# Patient Record
Sex: FEMALE | Race: BLACK OR AFRICAN AMERICAN | NOT HISPANIC OR LATINO | Employment: FULL TIME | URBAN - METROPOLITAN AREA
[De-identification: names, ages, dates, MRNs, and addresses within clinical notes are randomized per-mention and may not be internally consistent; named-entity substitution may affect disease eponyms.]

---

## 2023-02-24 LAB
AMPHETAMINES,URINE: 2441 NG/ML
MDA,URINE: <200 NG/ML
MDEA,URINE: <200 NG/ML
MDMA,UR: <200 NG/ML
METHAMPHETAMINE QUANTITATIVE URINE: <200 NG/ML
PHENTERMINE,UR: <200 NG/ML

## 2023-08-03 ENCOUNTER — APPOINTMENT (OUTPATIENT)
Dept: LAB | Facility: LAB | Age: 33
End: 2023-08-03
Payer: COMMERCIAL

## 2023-08-03 LAB
AMPHETAMINE (PRESENCE) IN URINE BY SCREEN METHOD: ABNORMAL
BARBITURATES PRESENCE IN URINE BY SCREEN METHOD: ABNORMAL
BENZODIAZEPINE (PRESENCE) IN URINE BY SCREEN METHOD: ABNORMAL
CANNABINOIDS IN URINE BY SCREEN METHOD: ABNORMAL
COCAINE (PRESENCE) IN URINE BY SCREEN METHOD: ABNORMAL
DRUG SCREEN COMMENT URINE: ABNORMAL
FENTANYL URINE: ABNORMAL
METHADONE (PRESENCE) IN URINE BY SCREEN METHOD: ABNORMAL
OPIATES (PRESENCE) IN URINE BY SCREEN METHOD: ABNORMAL
OXYCODONE (PRESENCE) IN URINE BY SCREEN METHOD: ABNORMAL
PHENCYCLIDINE (PRESENCE) IN URINE BY SCREEN METHOD: ABNORMAL

## 2023-08-07 LAB
AMPHETAMINES,URINE: 1118 NG/ML
MDA,URINE: <200 NG/ML
MDEA,URINE: <200 NG/ML
MDMA,UR: <200 NG/ML
METHAMPHETAMINE QUANTITATIVE URINE: <200 NG/ML
PHENTERMINE,UR: <200 NG/ML

## 2023-10-12 ENCOUNTER — LAB (OUTPATIENT)
Dept: LAB | Facility: LAB | Age: 33
End: 2023-10-12
Payer: COMMERCIAL

## 2023-10-12 ENCOUNTER — OFFICE VISIT (OUTPATIENT)
Dept: OBSTETRICS AND GYNECOLOGY | Facility: CLINIC | Age: 33
End: 2023-10-12
Payer: COMMERCIAL

## 2023-10-12 VITALS
WEIGHT: 165 LBS | BODY MASS INDEX: 26.52 KG/M2 | HEIGHT: 66 IN | DIASTOLIC BLOOD PRESSURE: 72 MMHG | SYSTOLIC BLOOD PRESSURE: 110 MMHG

## 2023-10-12 DIAGNOSIS — N94.6 DYSMENORRHEA: ICD-10-CM

## 2023-10-12 DIAGNOSIS — N92.6 IRREGULAR MENSES: ICD-10-CM

## 2023-10-12 DIAGNOSIS — Z11.3 SCREEN FOR STD (SEXUALLY TRANSMITTED DISEASE): ICD-10-CM

## 2023-10-12 DIAGNOSIS — N92.6 IRREGULAR MENSES: Primary | ICD-10-CM

## 2023-10-12 LAB
ALBUMIN SERPL BCP-MCNC: 4.3 G/DL (ref 3.4–5)
ALP SERPL-CCNC: 66 U/L (ref 33–110)
ALT SERPL W P-5'-P-CCNC: 16 U/L (ref 7–45)
ANION GAP SERPL CALC-SCNC: 10 MMOL/L (ref 10–20)
AST SERPL W P-5'-P-CCNC: 16 U/L (ref 9–39)
BILIRUB SERPL-MCNC: 0.4 MG/DL (ref 0–1.2)
BUN SERPL-MCNC: 15 MG/DL (ref 6–23)
CALCIUM SERPL-MCNC: 9.4 MG/DL (ref 8.6–10.3)
CHLORIDE SERPL-SCNC: 103 MMOL/L (ref 98–107)
CO2 SERPL-SCNC: 27 MMOL/L (ref 21–32)
CREAT SERPL-MCNC: 1.18 MG/DL (ref 0.5–1.05)
GFR SERPL CREATININE-BSD FRML MDRD: 63 ML/MIN/1.73M*2
GLUCOSE SERPL-MCNC: 85 MG/DL (ref 74–99)
POTASSIUM SERPL-SCNC: 4.7 MMOL/L (ref 3.5–5.3)
PREGNANCY TEST URINE, POC: NEGATIVE
PROT SERPL-MCNC: 7.8 G/DL (ref 6.4–8.2)
SODIUM SERPL-SCNC: 135 MMOL/L (ref 136–145)
TSH SERPL-ACNC: 1.58 MIU/L (ref 0.44–3.98)

## 2023-10-12 PROCEDURE — 99203 OFFICE O/P NEW LOW 30 MIN: CPT | Performed by: NURSE PRACTITIONER

## 2023-10-12 PROCEDURE — 1036F TOBACCO NON-USER: CPT | Performed by: NURSE PRACTITIONER

## 2023-10-12 PROCEDURE — 80053 COMPREHEN METABOLIC PANEL: CPT

## 2023-10-12 PROCEDURE — 87800 DETECT AGNT MULT DNA DIREC: CPT

## 2023-10-12 PROCEDURE — 82627 DEHYDROEPIANDROSTERONE: CPT

## 2023-10-12 PROCEDURE — 81025 URINE PREGNANCY TEST: CPT | Performed by: NURSE PRACTITIONER

## 2023-10-12 PROCEDURE — 83498 ASY HYDROXYPROGESTERONE 17-D: CPT

## 2023-10-12 PROCEDURE — 84402 ASSAY OF FREE TESTOSTERONE: CPT

## 2023-10-12 PROCEDURE — 84443 ASSAY THYROID STIM HORMONE: CPT

## 2023-10-12 PROCEDURE — 36415 COLL VENOUS BLD VENIPUNCTURE: CPT

## 2023-10-12 PROCEDURE — 83036 HEMOGLOBIN GLYCOSYLATED A1C: CPT

## 2023-10-12 RX ORDER — NORETHINDRONE ACETATE AND ETHINYL ESTRADIOL 1MG-20(21)
1 KIT ORAL DAILY
Qty: 28 TABLET | Refills: 11 | Status: SHIPPED | OUTPATIENT
Start: 2023-10-12 | End: 2023-11-01 | Stop reason: SDUPTHER

## 2023-10-12 NOTE — PROGRESS NOTES
"Assessment/Plan   Diagnoses and all orders for this visit:  Irregular menses  -     POC pregnancy, urine  -     norethindrone-e.estradioL-iron (Junel FE 1/20) 1 mg-20 mcg (21)/75 mg (7) tablet; Take 1 tablet by mouth once daily.  -     US pelvis; Future  -     17-Hydroxyprogesterone; Future  -     DHEA-sulfate; Future  -     Hemoglobin A1c; Future  -     Testosterone, free, total; Future  -     Comprehensive metabolic panel; Future  -     TSH; Future  Screen for STD (sexually transmitted disease)  -     C. Trachomatis / N. Gonorrhoeae, Amplified Detection    Discussed with patient that stress can affect menses causing cycles to become irregular.  Given chronic h/o irregular menses, PCOS labs ordered  Pelvic US ordered  Discussed use of contraception to regulate cycles, patient agreeable  Rx sent to pharmacy, ACHES s/sx reviewed  Follow up pending results    Sera Becerra, APRN-CNM, APRN-CNP    Subjective   Sera Marie is a 32 y.o. female , who presents for irregular menses. Patient reports irregular periods last year, having a period ~ every 3 months. She states periods became more regular earlier this year (occurring ~ every 4 weeks), but now reports missing periods in August and September. Currently on period now (prior period before this ), reports this period has been longer than typical, heavier, with more cramping. She changes her tampon (super size) every 3 hours. Dysmenorrhea: mild, occurring throughout menses. Cyclic symptoms include: none. Current contraception: none. 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     Menstrual History:  OB History          2    Para   1    Term   1       0    AB   1    Living   1         SAB   1    IAB        Ectopic        Multiple        Live Births                 Patient's last menstrual period was 10/03/2023.     Objective   /72   Ht 1.664 m (5' 5.5\")   Wt 74.8 kg (165 lb)   " Statement Selected LMP 10/03/2023   BMI 27.04 kg/m²     Physical Exam  Constitutional:       General: She is not in acute distress.     Appearance: Normal appearance.   Pulmonary:      Effort: Pulmonary effort is normal.   Abdominal:      Palpations: Abdomen is soft. There is no mass.      Tenderness: There is no abdominal tenderness.      Hernia: No hernia is present.   Neurological:      General: No focal deficit present.      Mental Status: She is alert and oriented to person, place, and time. Mental status is at baseline.   Skin:     General: Skin is warm and dry.   Psychiatric:         Mood and Affect: Mood normal.         Behavior: Behavior normal.         Thought Content: Thought content normal.         Judgment: Judgment normal.

## 2023-10-13 LAB
C TRACH RRNA SPEC QL NAA+PROBE: NEGATIVE
DHEA-S SERPL-MCNC: 240 UG/DL (ref 12–379)
EST. AVERAGE GLUCOSE BLD GHB EST-MCNC: 100 MG/DL
HBA1C MFR BLD: 5.1 %
N GONORRHOEA DNA SPEC QL PROBE+SIG AMP: NEGATIVE

## 2023-10-15 LAB — 17OHP SERPL-MCNC: 81.93 NG/DL

## 2023-10-17 LAB
TESTOSTERONE FREE (CHAN): 8.8 PG/ML (ref 0.1–6.4)
TESTOSTERONE,TOTAL,LC-MS/MS: 79 NG/DL (ref 2–45)

## 2023-10-19 ENCOUNTER — HOSPITAL ENCOUNTER (OUTPATIENT)
Dept: RADIOLOGY | Facility: HOSPITAL | Age: 33
End: 2023-10-19
Payer: COMMERCIAL

## 2023-10-19 PROBLEM — G47.9 SLEEP DIFFICULTIES: Status: ACTIVE | Noted: 2023-10-19

## 2023-10-19 PROBLEM — F32.A DEPRESSION: Status: ACTIVE | Noted: 2023-10-19

## 2023-10-19 PROBLEM — F43.10 PTSD (POST-TRAUMATIC STRESS DISORDER): Status: ACTIVE | Noted: 2023-10-19

## 2023-10-19 PROBLEM — F41.9 ANXIETY: Status: ACTIVE | Noted: 2023-10-19

## 2023-10-19 PROBLEM — F90.2 ADHD (ATTENTION DEFICIT HYPERACTIVITY DISORDER), COMBINED TYPE: Status: ACTIVE | Noted: 2023-10-19

## 2023-10-19 PROBLEM — Z97.5 IUD (INTRAUTERINE DEVICE) IN PLACE: Status: ACTIVE | Noted: 2023-10-19

## 2023-10-19 PROBLEM — N89.8 VAGINAL DISCHARGE: Status: ACTIVE | Noted: 2023-10-19

## 2023-10-19 PROBLEM — F41.0 PANIC ATTACK: Status: ACTIVE | Noted: 2023-10-19

## 2023-10-19 PROBLEM — N94.6 MENSTRUAL CRAMPS: Status: ACTIVE | Noted: 2023-10-19

## 2023-10-19 RX ORDER — BUPROPION HYDROCHLORIDE 150 MG/1
150 TABLET ORAL DAILY
COMMUNITY
End: 2023-10-22 | Stop reason: SDUPTHER

## 2023-10-19 RX ORDER — ACETAMINOPHEN 325 MG/1
TABLET ORAL
COMMUNITY

## 2023-10-19 RX ORDER — IBUPROFEN 800 MG/1
800 TABLET ORAL 2 TIMES DAILY PRN
COMMUNITY
End: 2023-12-14 | Stop reason: ALTCHOICE

## 2023-10-20 ENCOUNTER — TELEMEDICINE (OUTPATIENT)
Dept: BEHAVIORAL HEALTH | Facility: CLINIC | Age: 33
End: 2023-10-20
Payer: COMMERCIAL

## 2023-10-20 ENCOUNTER — TELEPHONE (OUTPATIENT)
Dept: OBSTETRICS AND GYNECOLOGY | Facility: CLINIC | Age: 33
End: 2023-10-20

## 2023-10-20 DIAGNOSIS — R79.89 ELEVATED SERUM CREATININE: Primary | ICD-10-CM

## 2023-10-20 DIAGNOSIS — F41.9 ANXIETY: ICD-10-CM

## 2023-10-20 DIAGNOSIS — F90.2 ADHD (ATTENTION DEFICIT HYPERACTIVITY DISORDER), COMBINED TYPE: ICD-10-CM

## 2023-10-20 DIAGNOSIS — F32.9 CURRENT EPISODE OF MAJOR DEPRESSIVE DISORDER WITHOUT PRIOR EPISODE, UNSPECIFIED DEPRESSION EPISODE SEVERITY: Primary | ICD-10-CM

## 2023-10-20 DIAGNOSIS — G47.9 SLEEP DIFFICULTIES: ICD-10-CM

## 2023-10-20 DIAGNOSIS — F90.2 ATTENTION DEFICIT HYPERACTIVITY DISORDER (ADHD), COMBINED TYPE: ICD-10-CM

## 2023-10-20 PROCEDURE — 99214 OFFICE O/P EST MOD 30 MIN: CPT | Performed by: NURSE PRACTITIONER

## 2023-10-20 ASSESSMENT — ANXIETY QUESTIONNAIRES
GAD7 TOTAL SCORE: 9
4. TROUBLE RELAXING: MORE THAN HALF THE DAYS
7. FEELING AFRAID AS IF SOMETHING AWFUL MIGHT HAPPEN: SEVERAL DAYS
1. FEELING NERVOUS, ANXIOUS, OR ON EDGE: SEVERAL DAYS
3. WORRYING TOO MUCH ABOUT DIFFERENT THINGS: SEVERAL DAYS
6. BECOMING EASILY ANNOYED OR IRRITABLE: MORE THAN HALF THE DAYS
5. BEING SO RESTLESS THAT IT IS HARD TO SIT STILL: SEVERAL DAYS
IF YOU CHECKED OFF ANY PROBLEMS ON THIS QUESTIONNAIRE, HOW DIFFICULT HAVE THESE PROBLEMS MADE IT FOR YOU TO DO YOUR WORK, TAKE CARE OF THINGS AT HOME, OR GET ALONG WITH OTHER PEOPLE: SOMEWHAT DIFFICULT
2. NOT BEING ABLE TO STOP OR CONTROL WORRYING: SEVERAL DAYS

## 2023-10-20 ASSESSMENT — PATIENT HEALTH QUESTIONNAIRE - PHQ9
1. LITTLE INTEREST OR PLEASURE IN DOING THINGS: NOT AT ALL
3. TROUBLE FALLING OR STAYING ASLEEP OR SLEEPING TOO MUCH: SEVERAL DAYS
7. TROUBLE CONCENTRATING ON THINGS, SUCH AS READING THE NEWSPAPER OR WATCHING TELEVISION: MORE THAN HALF THE DAYS
4. FEELING TIRED OR HAVING LITTLE ENERGY: SEVERAL DAYS
5. POOR APPETITE OR OVEREATING: NOT AT ALL
2. FEELING DOWN, DEPRESSED OR HOPELESS: SEVERAL DAYS
8. MOVING OR SPEAKING SO SLOWLY THAT OTHER PEOPLE COULD HAVE NOTICED. OR THE OPPOSITE, BEING SO FIGETY OR RESTLESS THAT YOU HAVE BEEN MOVING AROUND A LOT MORE THAN USUAL: NOT AT ALL
6. FEELING BAD ABOUT YOURSELF - OR THAT YOU ARE A FAILURE OR HAVE LET YOURSELF OR YOUR FAMILY DOWN: SEVERAL DAYS
10. IF YOU CHECKED OFF ANY PROBLEMS, HOW DIFFICULT HAVE THESE PROBLEMS MADE IT FOR YOU TO DO YOUR WORK, TAKE CARE OF THINGS AT HOME, OR GET ALONG WITH OTHER PEOPLE: SOMEWHAT DIFFICULT
9. THOUGHTS THAT YOU WOULD BE BETTER OFF DEAD, OR OF HURTING YOURSELF: NOT AT ALL

## 2023-10-20 NOTE — TELEPHONE ENCOUNTER
----- Message from DON Sutherland-CNM, DON-CNP sent at 10/20/2023 11:42 AM EDT -----  I sent a message to Sera on [employer] discussing new diagnosis of PCOS. I also want her to repeat CMP given elevated creatinine. Order is placed. Please follow up to make sure she sees my message.

## 2023-10-20 NOTE — TELEPHONE ENCOUNTER
Called pt, no answer, left voicemail for return call  Left message making pt aware order is in place for bloodwork to be drawn at the lab

## 2023-10-20 NOTE — PROGRESS NOTES
"Adult Ambulatory Psychiatry Progress Note      Assessment/Plan     Impression:  Sera Marie is a 33 y.o. female domiciled dating, employed with  as senior  with [employer] who presents for follow up with CC of \"I am doing ok.\"    Plan: Continues taking medications and no changes to tx plan.  Medication: Adderall XR 15mg every day, Adderall 15mg every day in the afternoon, Wellbutrin XL 150mg every day, Propranolol 10mg-20mg for anxiety, test anxiety.    Reviewed r/b/a, possible side effects of the medication. Client is aware about the benefit outweighs the risk.     Diagnoses and all orders for this visit:  Current episode of major depressive disorder without prior episode, unspecified depression episode severity  -     buPROPion XL (Wellbutrin XL) 150 mg 24 hr tablet; Take 1 tablet (150 mg) by mouth once daily. Do not crush, chew, or split.  ADHD (attention deficit hyperactivity disorder), combined type  -     amphetamine-dextroamphetamine (Adderall) 15 mg tablet; TAKE 1 TABLET BY MOUTH DAILY IN THE AFTERNOON  Anxiety  -     propranolol (Inderal) 10 mg tablet; TAKE 1 TO 2 TABLETS BY MOUTH FOR PHYSICAL ANXIETY MANAGMENT/TEST ANXIETY  Sleep difficulties  Attention deficit hyperactivity disorder (ADHD), combined type  -     amphetamine-dextroamphetamine XR (Adderall XR) 15 mg 24 hr capsule; Take 1 capsule (15 mg) by mouth once daily. Do not crush or chew.      Therapy: none    Other: n/a    Patient is reminded that if there is SI to call [phone] and get themselves to the closest ED for evaluation, otherwise contact me for other questions/concerns.     Subjective   HPI:  \"Both the patient, and family and caregivers and guardians as appropriate, were informed of the current need to conduct treatment via telephone. I have confirmed the patient's identity via the following (minimum of three) acceptable identifiers as per  Policy PH - , S.S., home address.\"     Present Illness - anxiety   "   Characteristics/Recent psychiatric symptoms (pertinent positives and negatives) - reports stressing out about possibly having PCOS and having to talk with her PCP about if that is the issue, dealing with pain in her ovaries, or even having fibroids. Was put on Junel birth control last week to help control her symptoms. Appetite is up still if not higher. 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 Reports sleep is not the best, as she has a lot of racing, obsessive thoughts leaving her awake - about worrying with passing/not passing the NCLEX. Endorses not having nightmares about this, but 'just overthinking about this' and only gets 5-6 hours. Wakes up tired and at times will take naps. Will workout after work, or cleans her home, and will study for several hours (takes 50 test questions at a time). Reports her relationship with her bf is 'fine. More easy going, especially since nursing school is done.' as they recently got back from a trip to [place], [place] and 'we really had a good time.' Reports struggling at work with workload being so much, and being under-staffed. Reports not having picked up her Adderall d/t $$ for over the past month, d/t only being part time hours, and only went back to full time 2 weeks ago, and can now afford the medication, but her ADHD has been not well-controlled. Endorses some depression 'as I feel sad - like my classmates had already taken the NCLEX, and passed and working, but also just missing school.' Has Propranolol for the physical anxiety when needed. Appetite is 'fine.'     Onset/timeframe - 1 month  Type - anxiety  Duration - daily  Aggravating and/or relieving factors/triggers - worrying about PCOS and also having to study for her NCLEX.  Related symptoms  Treatment and treatment changes (new meds, dosage increases or decreases, med compliance, therapy frequency, etc.)  (Past and Recent) - Adderall 15mg in the afternoon and ER 15mg QD, Propranolol 20mg PRN, Clonidine 0.1mg @HS, Wellbutrin XL 150mg QD.     Issues: Denies SI/HI/AVH.      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     Reports constipation is now only every other day, even without Adderall. Had tried Metamucil and Colace.         Review of Systems   Respiratory: Negative.     Cardiovascular: Negative.    Genitourinary:  Positive for pelvic pain.        PCOS reported   Musculoskeletal: Negative.    Neurological: Negative.        OARRS:  Reviewed OARRS on 10/22/2023 by Calin Person, DON-CNP -OARRS has been reviewed and is consistent with prescribed medications, Considered the risks of abuse, dependence, addiction and diversion, Medication is felt to be clinically appropriate based on documented diagnosis        Is the patient prescribed a combination of a benzodiazepine and opioid?  No    Last Urine Drug Screen / ordered today: No  Recent Results (from the past 8760 hour(s))   Amphetamine Confirm, Urine    Collection Time: 08/03/23  1:55 PM   Result Value Ref Range    Amphetamines,Urine 1,118 ng/mL    MDA, Urine <200 ng/mL    MDEA, Urine <200 ng/mL    MDMA, Urine <200 ng/mL    Methamphetamine Quant, Ur <200 ng/mL    Phentermine,Urine <200 ng/mL   Drug Screen, Urine With Reflex to Confirmation    Collection Time: 08/03/23  1:55 PM   Result Value Ref Range    DRUG SCREEN COMMENT URINE SEE BELOW     Amphetamine Screen, Urine PRESUMPTIVE POSITIVE (A) NEGATIVE    Barbiturate Screen, Urine PRESUMPTIVE NEGATIVE NEGATIVE    BENZODIAZEPINE (PRESENCE) IN URINE BY SCREEN METHOD PRESUMPTIVE NEGATIVE NEGATIVE    Cannabinoid Screen, Urine PRESUMPTIVE NEGATIVE NEGATIVE    Cocaine Screen, Urine PRESUMPTIVE NEGATIVE NEGATIVE    Fentanyl, Ur PRESUMPTIVE NEGATIVE NEGATIVE    Methadone Screen, Urine PRESUMPTIVE NEGATIVE NEGATIVE    Opiate Screen, Urine PRESUMPTIVE NEGATIVE NEGATIVE     Oxycodone Screen, Ur PRESUMPTIVE NEGATIVE NEGATIVE    PCP Screen, Urine PRESUMPTIVE NEGATIVE NEGATIVE     Results are as expected.         Controlled Substance Agreement:  Date of the Last Agreement: May 26, 2021   Reviewed Controlled Substance Agreement including but not limited to the benefits, risks, and alternatives to treatment with a Controlled Substance medication(s).    Stimulants:   What is the patient's goal of therapy? Improved attention/focus  Is this being achieved with current treatment? When taking medication yes.    Activities of Daily Living:   Is your overall impression that this patient is benefiting (symptom reduction outweighs side effects) from stimulant therapy? Yes     1. Physical Functioning: Better  2. Family Relationship: Same  3. Social Relationship: Same  4. Mood: Better  5. Sleep Patterns: Same  6. Overall Function: Better      Objective   Mental Status Exam:  General Appearance: Well groomed, appropriate eye contact  Attitude/Behavior: Superficially cooperative, Fair eye contact (stared off screen frequently as bf was standing in doorway)  Motor: Fidgeting  Speech: Normal rate, volume, prosody  Gait/Station: Other:(comment) (sitting on bed over ZOOM)  Mood: 'anxious'  Affect: Anxious, Euthymic, full-range, Congruent with mood and topic of conversation  Thought Process: Linear, goal directed, Flight of ideas  Thought Associations: No loosening of associations  Thought Content: Normal (fretting about NCLEX, and her PCOS/health issues)  Perception: No perceptual abnormalities noted  Sensorium: Alert and oriented to person, place, time and situation  Insight: Intact  Judgement: Fair  Cognition: Cognitively intact to conversational testing with respect to attention, orientation, fund of knowledge, recent and remote memory, and language  Testing: N/A    BRAD-7/PHQ-9 scores reviewed: 9, 6 compared to 13, 7 reflecting mild improvement in anxiety and depression even though patient's subjective  reported symptoms are higher.      Current Medications:  Current Outpatient Medications on File Prior to Visit   Medication Sig Dispense Refill    norethindrone-e.estradioL-iron (Junel FE 1/20) 1 mg-20 mcg (21)/75 mg (7) tablet Take 1 tablet by mouth once daily. 28 tablet 11    [DISCONTINUED] amphetamine-dextroamphetamine (Adderall) 15 mg tablet TAKE 1 TABLET BY MOUTH DAILY IN THE AFTERNOON 30 tablet 0    [DISCONTINUED] buPROPion XL (Wellbutrin XL) 150 mg 24 hr tablet Take 1 tablet (150 mg) by mouth once daily. Do not crush, chew, or split.      [DISCONTINUED] propranolol (Inderal) 10 mg tablet TAKE 1 TO 2 TABLETS BY MOUTH FOR PHYSICAL ANXIETY MANAGMENT/TEST ANXIETY 180 tablet 1    acetaminophen (Tylenol) 325 mg tablet Take by mouth.      ibuprofen 800 mg tablet Take 1 tablet (800 mg) by mouth 2 times a day as needed for mild pain (1 - 3).       No current facility-administered medications on file prior to visit.       Lab Review:   Lab on 10/12/2023   Component Date Value    17-Hydroxyprogesterone 10/12/2023 81.93     DHEA Sulfate 10/12/2023 240     Hemoglobin A1C 10/12/2023 5.1     Estimated Average Glucose 10/12/2023 100     Testosterone, Free 10/12/2023 8.8 (H)     Testosterone, Total, LC-* 10/12/2023 79 (H)     Glucose 10/12/2023 85     Sodium 10/12/2023 135 (L)     Potassium 10/12/2023 4.7     Chloride 10/12/2023 103     Bicarbonate 10/12/2023 27     Anion Gap 10/12/2023 10     Urea Nitrogen 10/12/2023 15     Creatinine 10/12/2023 1.18 (H)     eGFR 10/12/2023 63     Calcium 10/12/2023 9.4     Albumin 10/12/2023 4.3     Alkaline Phosphatase 10/12/2023 66     Total Protein 10/12/2023 7.8     AST 10/12/2023 16     Bilirubin, Total 10/12/2023 0.4     ALT 10/12/2023 16     Thyroid Stimulating Horm* 10/12/2023 1.58    Office Visit on 10/12/2023   Component Date Value    Preg Test, Ur 10/12/2023 Negative     Neisseria gonorrhea,Ampl* 10/12/2023 Negative     Chlamydia trachomatis, A* 10/12/2023 Negative    Orders Only  on 08/03/2023   Component Date Value    DRUG SCREEN COMMENT URINE 08/03/2023 SEE BELOW     Amphetamine Screen, Urine 08/03/2023 PRESUMPTIVE POSITIVE (A)     Barbiturate Screen, Urine 08/03/2023 PRESUMPTIVE NEGATIVE     BENZODIAZEPINE (PRESENCE* 08/03/2023 PRESUMPTIVE NEGATIVE     Cannabinoid Screen, Urine 08/03/2023 PRESUMPTIVE NEGATIVE     Cocaine Screen, Urine 08/03/2023 PRESUMPTIVE NEGATIVE     Fentanyl, Ur 08/03/2023 PRESUMPTIVE NEGATIVE     Methadone Screen, Urine 08/03/2023 PRESUMPTIVE NEGATIVE     Opiate Screen, Urine 08/03/2023 PRESUMPTIVE NEGATIVE     Oxycodone Screen, Ur 08/03/2023 PRESUMPTIVE NEGATIVE     PCP Screen, Urine 08/03/2023 PRESUMPTIVE NEGATIVE     Amphetamines,Urine 08/03/2023 1,118     MDA, Urine 08/03/2023 <200     MDEA, Urine 08/03/2023 <200     MDMA, Urine 08/03/2023 <200     Methamphetamine Quant, Ur 08/03/2023 <200     Phentermine,Urine 08/03/2023 <200          Time Spent:    Prep time: 1 min  Direct patient time: 28 min  Documentation time: 9 min.  Total time: 38 min.    Next Appointment:  Follow up in about 4 weeks (around 11/17/2023).

## 2023-10-22 ENCOUNTER — PHARMACY VISIT (OUTPATIENT)
Dept: PHARMACY | Facility: CLINIC | Age: 33
End: 2023-10-22
Payer: COMMERCIAL

## 2023-10-22 RX ORDER — BUPROPION HYDROCHLORIDE 150 MG/1
150 TABLET ORAL DAILY
Qty: 90 TABLET | Refills: 3 | Status: SHIPPED | OUTPATIENT
Start: 2023-10-22 | End: 2023-12-14 | Stop reason: ALTCHOICE

## 2023-10-22 RX ORDER — DEXTROAMPHETAMINE SACCHARATE, AMPHETAMINE ASPARTATE, DEXTROAMPHETAMINE SULFATE AND AMPHETAMINE SULFATE 3.75; 3.75; 3.75; 3.75 MG/1; MG/1; MG/1; MG/1
TABLET ORAL
Qty: 30 TABLET | Refills: 0 | Status: SHIPPED | OUTPATIENT
Start: 2023-10-22 | End: 2024-01-11 | Stop reason: SDUPTHER

## 2023-10-22 RX ORDER — DEXTROAMPHETAMINE SACCHARATE, AMPHETAMINE ASPARTATE MONOHYDRATE, DEXTROAMPHETAMINE SULFATE AND AMPHETAMINE SULFATE 3.75; 3.75; 3.75; 3.75 MG/1; MG/1; MG/1; MG/1
15 CAPSULE, EXTENDED RELEASE ORAL DAILY
Qty: 30 CAPSULE | Refills: 0 | Status: SHIPPED | OUTPATIENT
Start: 2023-10-22 | End: 2024-01-11 | Stop reason: SDUPTHER

## 2023-10-22 RX ORDER — PROPRANOLOL HYDROCHLORIDE 10 MG/1
TABLET ORAL
Qty: 180 TABLET | Refills: 1 | Status: SHIPPED | OUTPATIENT
Start: 2023-10-22 | End: 2024-10-20

## 2023-10-22 ASSESSMENT — ENCOUNTER SYMPTOMS
NEUROLOGICAL NEGATIVE: 1
MUSCULOSKELETAL NEGATIVE: 1
CARDIOVASCULAR NEGATIVE: 1
RESPIRATORY NEGATIVE: 1

## 2023-10-23 ENCOUNTER — HOSPITAL ENCOUNTER (OUTPATIENT)
Dept: RADIOLOGY | Facility: HOSPITAL | Age: 33
Discharge: HOME | End: 2023-10-23
Payer: COMMERCIAL

## 2023-10-23 DIAGNOSIS — N92.6 IRREGULAR MENSES: ICD-10-CM

## 2023-10-23 PROCEDURE — 76856 US EXAM PELVIC COMPLETE: CPT | Performed by: RADIOLOGY

## 2023-10-23 PROCEDURE — 76856 US EXAM PELVIC COMPLETE: CPT

## 2023-10-23 PROCEDURE — 76830 TRANSVAGINAL US NON-OB: CPT | Performed by: RADIOLOGY

## 2023-10-25 ENCOUNTER — TELEPHONE (OUTPATIENT)
Dept: OBSTETRICS AND GYNECOLOGY | Facility: CLINIC | Age: 33
End: 2023-10-25
Payer: COMMERCIAL

## 2023-10-25 NOTE — TELEPHONE ENCOUNTER
Pt called to review recent lab results and US. She got the my chart message re: PCOS. Pt has follow up questions about fertility and if she will need to see DANIA. She would like to conceive again soon. Also reviewed US results and pt was reassured there were no urgent findings. Please advise. Thank you.

## 2023-10-26 ENCOUNTER — TELEPHONE (OUTPATIENT)
Dept: OBSTETRICS AND GYNECOLOGY | Facility: CLINIC | Age: 33
End: 2023-10-26
Payer: COMMERCIAL

## 2023-10-30 NOTE — TELEPHONE ENCOUNTER
Pt CB and is aware CARROLL Hutchins tried to reach her. She agrees to recommendation aof a VV and was transferred to the  to schedule.

## 2023-11-01 DIAGNOSIS — N92.6 IRREGULAR MENSES: ICD-10-CM

## 2023-11-01 RX ORDER — NORETHINDRONE ACETATE AND ETHINYL ESTRADIOL AND FERROUS FUMARATE 1MG-20(21)
1 KIT ORAL DAILY
Qty: 84 TABLET | Refills: 4 | Status: SHIPPED | OUTPATIENT
Start: 2023-11-01 | End: 2023-12-14 | Stop reason: ALTCHOICE

## 2023-11-22 ENCOUNTER — APPOINTMENT (OUTPATIENT)
Dept: BEHAVIORAL HEALTH | Facility: CLINIC | Age: 33
End: 2023-11-22
Payer: COMMERCIAL

## 2023-12-06 ENCOUNTER — TELEMEDICINE (OUTPATIENT)
Dept: BEHAVIORAL HEALTH | Facility: CLINIC | Age: 33
End: 2023-12-06
Payer: COMMERCIAL

## 2023-12-06 DIAGNOSIS — G47.9 SLEEP DIFFICULTIES: ICD-10-CM

## 2023-12-06 DIAGNOSIS — F41.9 ANXIETY: ICD-10-CM

## 2023-12-06 DIAGNOSIS — F90.2 ADHD (ATTENTION DEFICIT HYPERACTIVITY DISORDER), COMBINED TYPE: ICD-10-CM

## 2023-12-06 DIAGNOSIS — F33.0 MAJOR DEPRESSIVE DISORDER, RECURRENT EPISODE, MILD (CMS-HCC): ICD-10-CM

## 2023-12-06 PROCEDURE — 99214 OFFICE O/P EST MOD 30 MIN: CPT | Performed by: NURSE PRACTITIONER

## 2023-12-06 ASSESSMENT — PATIENT HEALTH QUESTIONNAIRE - PHQ9
4. FEELING TIRED OR HAVING LITTLE ENERGY: MORE THAN HALF THE DAYS
2. FEELING DOWN, DEPRESSED OR HOPELESS: NOT AT ALL
9. THOUGHTS THAT YOU WOULD BE BETTER OFF DEAD, OR OF HURTING YOURSELF: NOT AT ALL
6. FEELING BAD ABOUT YOURSELF - OR THAT YOU ARE A FAILURE OR HAVE LET YOURSELF OR YOUR FAMILY DOWN: NOT AT ALL
8. MOVING OR SPEAKING SO SLOWLY THAT OTHER PEOPLE COULD HAVE NOTICED. OR THE OPPOSITE, BEING SO FIGETY OR RESTLESS THAT YOU HAVE BEEN MOVING AROUND A LOT MORE THAN USUAL: NOT AT ALL
7. TROUBLE CONCENTRATING ON THINGS, SUCH AS READING THE NEWSPAPER OR WATCHING TELEVISION: NEARLY EVERY DAY
10. IF YOU CHECKED OFF ANY PROBLEMS, HOW DIFFICULT HAVE THESE PROBLEMS MADE IT FOR YOU TO DO YOUR WORK, TAKE CARE OF THINGS AT HOME, OR GET ALONG WITH OTHER PEOPLE: SOMEWHAT DIFFICULT
5. POOR APPETITE OR OVEREATING: SEVERAL DAYS
3. TROUBLE FALLING OR STAYING ASLEEP OR SLEEPING TOO MUCH: MORE THAN HALF THE DAYS
1. LITTLE INTEREST OR PLEASURE IN DOING THINGS: NOT AT ALL

## 2023-12-06 ASSESSMENT — ANXIETY QUESTIONNAIRES
6. BECOMING EASILY ANNOYED OR IRRITABLE: SEVERAL DAYS
4. TROUBLE RELAXING: NOT AT ALL
5. BEING SO RESTLESS THAT IT IS HARD TO SIT STILL: SEVERAL DAYS
3. WORRYING TOO MUCH ABOUT DIFFERENT THINGS: MORE THAN HALF THE DAYS
7. FEELING AFRAID AS IF SOMETHING AWFUL MIGHT HAPPEN: NOT AT ALL
GAD7 TOTAL SCORE: 6
1. FEELING NERVOUS, ANXIOUS, OR ON EDGE: SEVERAL DAYS
2. NOT BEING ABLE TO STOP OR CONTROL WORRYING: SEVERAL DAYS
IF YOU CHECKED OFF ANY PROBLEMS ON THIS QUESTIONNAIRE, HOW DIFFICULT HAVE THESE PROBLEMS MADE IT FOR YOU TO DO YOUR WORK, TAKE CARE OF THINGS AT HOME, OR GET ALONG WITH OTHER PEOPLE: SOMEWHAT DIFFICULT

## 2023-12-06 ASSESSMENT — ENCOUNTER SYMPTOMS
CARDIOVASCULAR NEGATIVE: 1
NEUROLOGICAL NEGATIVE: 1
RESPIRATORY NEGATIVE: 1

## 2023-12-06 NOTE — PROGRESS NOTES
"Adult Ambulatory Psychiatry Progress Note      Assessment/Plan     Impression:  Sera Marie is a 33 y.o. female domiciled dating, employed with  as senior  with [employer] who presents for follow up with CC of \"I am good. I passed the NCLEX. My last day here is Dec. 29th. I am nervous with starting this new job but I am also excited.\"    Plan: Continues taking medications and no changes to treatment plan.    Medication: Adderall XR 15mg every day, Adderall 15mg every day in the afternoon, Wellbutrin XL 150mg every day, Propranolol 10mg-20mg for anxiety, test anxiety.    Reviewed r/b/a, possible side effects of the medication. Client is aware about the benefit outweighs the risk.     Diagnoses and all orders for this visit:  ADHD (attention deficit hyperactivity disorder), combined type  Anxiety  Sleep difficulties  Major depressive disorder, recurrent episode, mild (CMS/HCC)        Therapy: none    Other: n/a    Patient is reminded that if there is SI to call [phone] and get themselves to the closest ED for evaluation, otherwise contact me for other questions/concerns.     Subjective   HPI:  \"Both the patient, and family and caregivers and guardians as appropriate, were informed of the current need to conduct treatment via telephone. 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 Reports sleep is improving as she is now relaxed from not having to study, and preparing for transitioning to her new role. Achieves 6-8 hours. Anxiety is improving if not still above average. Denies depression. Energy levels are improving, but can " feel mentally and physically tired 2-3 days out of the week, after work but also dealing with taking care of her bf and her son's needs, 'that I extend myself'. ADHD is well-managed when she has her Adderall - but has been low on funds so attention has been waning - and admits will be picking up her next script tomorrow. Propranolol helped with physical anxiety when needed - especially when taking her NCLEX. Appetite is 'fine.' Overall denies racing thoughts, but can have obsessive thoughts.      Onset/timeframe - 1 month  Type - anxiety  Duration - daily  Aggravating and/or relieving factors/triggers - worrying about PCOS and also having to study for her NCLEX.  Related symptoms  Treatment and treatment changes (new meds, dosage increases or decreases, med compliance, therapy frequency, etc.) (Past and Recent) - Adderall 15mg in the afternoon and ER 15mg QD, Propranolol 20mg PRN, Clonidine 0.1mg @HS, Wellbutrin XL 150mg QD.     Issues: Denies SI/HI/AVH.      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     Reports constipation is now only every other day, even without Adderall. Had tried Metamucil and Colace.         Review of Systems   Respiratory: Negative.     Cardiovascular: Negative.    Gastrointestinal: Negative.    Genitourinary:  Positive for pelvic pain.        PCOS reported   Neurological: Negative.        OARRS:  Reviewed OARRS on 10/22/2023 by DON Leiva-CNP -OARRS has been reviewed and is consistent with prescribed medications, Considered the risks of abuse, dependence, addiction and diversion, Medication is felt to be clinically appropriate based on documented diagnosis        Is the patient prescribed a combination of a benzodiazepine and opioid?  No    Last Urine Drug Screen / ordered today: No  Recent Results (from the past 8760 hour(s))   Amphetamine Confirm, Urine    Collection Time: 08/03/23  1:55 PM   Result Value Ref Range     Amphetamines,Urine 1,118 ng/mL    MDA, Urine <200 ng/mL    MDEA, Urine <200 ng/mL    MDMA, Urine <200 ng/mL    Methamphetamine Quant, Ur <200 ng/mL    Phentermine,Urine <200 ng/mL   Drug Screen, Urine With Reflex to Confirmation    Collection Time: 08/03/23  1:55 PM   Result Value Ref Range    DRUG SCREEN COMMENT URINE SEE BELOW     Amphetamine Screen, Urine PRESUMPTIVE POSITIVE (A) NEGATIVE    Barbiturate Screen, Urine PRESUMPTIVE NEGATIVE NEGATIVE    BENZODIAZEPINE (PRESENCE) IN URINE BY SCREEN METHOD PRESUMPTIVE NEGATIVE NEGATIVE    Cannabinoid Screen, Urine PRESUMPTIVE NEGATIVE NEGATIVE    Cocaine Screen, Urine PRESUMPTIVE NEGATIVE NEGATIVE    Fentanyl, Ur PRESUMPTIVE NEGATIVE NEGATIVE    Methadone Screen, Urine PRESUMPTIVE NEGATIVE NEGATIVE    Opiate Screen, Urine PRESUMPTIVE NEGATIVE NEGATIVE    Oxycodone Screen, Ur PRESUMPTIVE NEGATIVE NEGATIVE    PCP Screen, Urine PRESUMPTIVE NEGATIVE NEGATIVE     Results are as expected.         Controlled Substance Agreement:  Date of the Last Agreement: May 26, 2021   Reviewed Controlled Substance Agreement including but not limited to the benefits, risks, and alternatives to treatment with a Controlled Substance medication(s).    Stimulants:   What is the patient's goal of therapy? Improved attention/focus  Is this being achieved with current treatment? When taking medication yes.    Activities of Daily Living:   Is your overall impression that this patient is benefiting (symptom reduction outweighs side effects) from stimulant therapy? Yes     1. Physical Functioning: Better  2. Family Relationship: Same  3. Social Relationship: Same  4. Mood: Better  5. Sleep Patterns: Same  6. Overall Function: Better      Objective   Mental Status Exam:  General Appearance: Well groomed, appropriate eye contact  Attitude/Behavior: Cooperative  Motor: No psychomotor agitation or retardation, no tremor or other abnormal movements  Speech: Normal rate, volume, prosody  Gait/Station:  Walks gingerly with a cane (sitting on couch over virtual appointment)  Mood: 'happy, nervous'  Affect: Euthymic, full-range, Anxious, Congruent with mood and topic of conversation  Thought Process: Linear, goal directed  Thought Associations: No loosening of associations  Thought Content: Normal  Perception: No perceptual abnormalities noted  Sensorium: Alert and oriented to person, place, time and situation  Insight: Intact  Judgement: Intact  Cognition: Cognitively intact to conversational testing with respect to attention, orientation, fund of knowledge, recent and remote memory, and language  Testing: N/A    BRAD-7/PHQ-9 scores reviewed: 6, 8 compared to 9, 6 reflecting improvement in anxiety and a mild bump depression even though patient's subjective reported symptoms are better.       Current Medications:  Current Outpatient Medications on File Prior to Visit   Medication Sig Dispense Refill    acetaminophen (Tylenol) 325 mg tablet Take by mouth.      amphetamine-dextroamphetamine (Adderall) 15 mg tablet TAKE 1 TABLET BY MOUTH DAILY IN THE AFTERNOON 30 tablet 0    buPROPion XL (Wellbutrin XL) 150 mg 24 hr tablet Take 1 tablet (150 mg) by mouth once daily. Do not crush, chew, or split. 90 tablet 3    ibuprofen 800 mg tablet Take 1 tablet (800 mg) by mouth 2 times a day as needed for mild pain (1 - 3).      Junel FE 1/20, 28, 1 mg-20 mcg (21)/75 mg (7) tablet TAKE 1 TABLET BY MOUTH EVERY DAY 84 tablet 4    propranolol (Inderal) 10 mg tablet TAKE 1 TO 2 TABLETS BY MOUTH FOR PHYSICAL ANXIETY MANAGMENT/TEST ANXIETY 180 tablet 1    amphetamine-dextroamphetamine XR (Adderall XR) 15 mg 24 hr capsule Take 1 capsule (15 mg) by mouth once daily. Do not crush or chew. 30 capsule 0     No current facility-administered medications on file prior to visit.       Lab Review:   Lab on 10/12/2023   Component Date Value    17-Hydroxyprogesterone 10/12/2023 81.93     DHEA Sulfate 10/12/2023 240     Hemoglobin A1C 10/12/2023 5.1      Estimated Average Glucose 10/12/2023 100     Testosterone, Free 10/12/2023 8.8 (H)     Testosterone, Total, LC-* 10/12/2023 79 (H)     Glucose 10/12/2023 85     Sodium 10/12/2023 135 (L)     Potassium 10/12/2023 4.7     Chloride 10/12/2023 103     Bicarbonate 10/12/2023 27     Anion Gap 10/12/2023 10     Urea Nitrogen 10/12/2023 15     Creatinine 10/12/2023 1.18 (H)     eGFR 10/12/2023 63     Calcium 10/12/2023 9.4     Albumin 10/12/2023 4.3     Alkaline Phosphatase 10/12/2023 66     Total Protein 10/12/2023 7.8     AST 10/12/2023 16     Bilirubin, Total 10/12/2023 0.4     ALT 10/12/2023 16     Thyroid Stimulating Horm* 10/12/2023 1.58    Office Visit on 10/12/2023   Component Date Value    Preg Test, Ur 10/12/2023 Negative     Neisseria gonorrhea,Ampl* 10/12/2023 Negative     Chlamydia trachomatis, A* 10/12/2023 Negative    Orders Only on 08/03/2023   Component Date Value    DRUG SCREEN COMMENT URINE 08/03/2023 SEE BELOW     Amphetamine Screen, Urine 08/03/2023 PRESUMPTIVE POSITIVE (A)     Barbiturate Screen, Urine 08/03/2023 PRESUMPTIVE NEGATIVE     BENZODIAZEPINE (PRESENCE* 08/03/2023 PRESUMPTIVE NEGATIVE     Cannabinoid Screen, Urine 08/03/2023 PRESUMPTIVE NEGATIVE     Cocaine Screen, Urine 08/03/2023 PRESUMPTIVE NEGATIVE     Fentanyl, Ur 08/03/2023 PRESUMPTIVE NEGATIVE     Methadone Screen, Urine 08/03/2023 PRESUMPTIVE NEGATIVE     Opiate Screen, Urine 08/03/2023 PRESUMPTIVE NEGATIVE     Oxycodone Screen, Ur 08/03/2023 PRESUMPTIVE NEGATIVE     PCP Screen, Urine 08/03/2023 PRESUMPTIVE NEGATIVE     Amphetamines,Urine 08/03/2023 1,118     MDA, Urine 08/03/2023 <200     MDEA, Urine 08/03/2023 <200     MDMA, Urine 08/03/2023 <200     Methamphetamine Quant, Ur 08/03/2023 <200     Phentermine,Urine 08/03/2023 <200          Time Spent:    Prep time: 1 min  Direct patient time: 29 min  Documentation time: 8 min.  Total time: 38 min.    Next Appointment:  Follow up in about 6 weeks (around 1/17/2024).

## 2023-12-07 PROBLEM — F33.0 MAJOR DEPRESSIVE DISORDER, RECURRENT EPISODE, MILD (CMS-HCC): Status: ACTIVE | Noted: 2023-12-07

## 2023-12-07 ASSESSMENT — ENCOUNTER SYMPTOMS: GASTROINTESTINAL NEGATIVE: 1

## 2023-12-14 ENCOUNTER — TELEMEDICINE (OUTPATIENT)
Dept: OBSTETRICS AND GYNECOLOGY | Facility: CLINIC | Age: 33
End: 2023-12-14
Payer: COMMERCIAL

## 2023-12-14 VITALS — WEIGHT: 170 LBS | HEIGHT: 66 IN | BODY MASS INDEX: 27.32 KG/M2

## 2023-12-14 DIAGNOSIS — E28.2 PCOS (POLYCYSTIC OVARIAN SYNDROME): Primary | ICD-10-CM

## 2023-12-14 PROCEDURE — 99443 PR PHYS/QHP TELEPHONE EVALUATION 21-30 MIN: CPT | Performed by: NURSE PRACTITIONER

## 2023-12-14 RX ORDER — DROSPIRENONE AND ETHINYL ESTRADIOL 0.02-3(28)
1 KIT ORAL DAILY
Qty: 28 TABLET | Refills: 11 | Status: SHIPPED | OUTPATIENT
Start: 2023-12-14 | End: 2024-12-13

## 2023-12-14 ASSESSMENT — PAIN SCALES - GENERAL: PAINLEVEL: 0-NO PAIN

## 2023-12-14 NOTE — PROGRESS NOTES
"Assessment/Plan   Diagnoses and all orders for this visit:  PCOS (polycystic ovarian syndrome)  -     drospirenone-ethinyl estradioL (Keara Lloyd) 3-0.02 mg tablet; Take 1 tablet by mouth once daily.    Reviewed labs and imaging with patient, discussed they are consistent with PCOS diagnosis. Discussed management of PCOS typically with contraception. Patient desires pregnancy but not for at least the next year. Patient agreeable to trial of COCs to regulate periods. BUSHRA Rx sent. ACHES s/sx reviewed.     Follow up in about 3 months (around 3/14/2024).     Sera Becerra, APRN-CNM, APRN-CNP    Subjective   Seraaparna Marie is a 33 y.o. female presents for telehealth visit to review labs and imaging.     HPI    Patient with h/o irregular periods. LMP 11/-9, has not yet gotten a period this month yet. Patient reports hirsutism, but states she has always had this even as a child, has been undergoing laser removal for facial hair.     Menstrual History:  OB History          2    Para   1    Term   1       0    AB   1    Living   1         SAB   1    IAB        Ectopic        Multiple        Live Births                    Patient's last menstrual period was 2023.       Objective   Ht 1.676 m (5' 6\")   Wt 77.1 kg (170 lb)   LMP 2023   BMI 27.44 kg/m²     Recent Results (from the past 1680 hour(s))   POC pregnancy, urine    Collection Time: 10/12/23  3:02 PM   Result Value Ref Range    Preg Test, Ur Negative Negative   17-Hydroxyprogesterone    Collection Time: 10/12/23  3:31 PM   Result Value Ref Range    17-Hydroxyprogesterone 81.93 <=206.00 ng/dL   DHEA-sulfate    Collection Time: 10/12/23  3:31 PM   Result Value Ref Range    DHEA Sulfate 240 12 - 379 ug/dL   Hemoglobin A1c    Collection Time: 10/12/23  3:31 PM   Result Value Ref Range    Hemoglobin A1C 5.1 see below %    Estimated Average Glucose 100 Not Established mg/dL   Testosterone, free, total    Collection Time: 10/12/23  3:31 PM "   Result Value Ref Range    Testosterone, Free 8.8 (H) 0.1 - 6.4 pg/mL    Testosterone, Total, LC-MS/MS 79 (H) 2 - 45 ng/dL   Comprehensive metabolic panel    Collection Time: 10/12/23  3:31 PM   Result Value Ref Range    Glucose 85 74 - 99 mg/dL    Sodium 135 (L) 136 - 145 mmol/L    Potassium 4.7 3.5 - 5.3 mmol/L    Chloride 103 98 - 107 mmol/L    Bicarbonate 27 21 - 32 mmol/L    Anion Gap 10 10 - 20 mmol/L    Urea Nitrogen 15 6 - 23 mg/dL    Creatinine 1.18 (H) 0.50 - 1.05 mg/dL    eGFR 63 >60 mL/min/1.73m*2    Calcium 9.4 8.6 - 10.3 mg/dL    Albumin 4.3 3.4 - 5.0 g/dL    Alkaline Phosphatase 66 33 - 110 U/L    Total Protein 7.8 6.4 - 8.2 g/dL    AST 16 9 - 39 U/L    Bilirubin, Total 0.4 0.0 - 1.2 mg/dL    ALT 16 7 - 45 U/L   TSH    Collection Time: 10/12/23  3:31 PM   Result Value Ref Range    Thyroid Stimulating Hormone 1.58 0.44 - 3.98 mIU/L   C. Trachomatis / N. Gonorrhoeae, Amplified Detection    Collection Time: 10/12/23  4:24 PM   Result Value Ref Range    Neisseria gonorrhea,Amplified Negative Negative    Chlamydia trachomatis, Amplified Negative Negative

## 2023-12-14 NOTE — PATIENT INSTRUCTIONS
"What is polycystic ovary syndrome?  Polycystic ovary syndrome (\"PCOS\") is a condition that can cause irregular periods, acne, extra facial hair, or hair loss from the head. It is very common. About 5 to 8 percent of all women have PCOS. Most, but not all, people with PCOS have excess weight or obesity.    What causes PCOS?  In PCOS, the ovaries do not work normally and they produce too much testosterone. Testosterone is called a \"male hormone,\" but all people have some testosterone. Normally, the ovaries produce very small amounts. But in PCOS, they make more.    About once a month, the ovaries are supposed to make a structure called a \"follicle\". As the follicle grows, it makes hormones. Then, it releases an egg. This is called \"ovulation.\" But in people with PCOS, the ovary makes many small follicles instead of 1 big one. Hormone levels can get out of balance. And ovulation doesn't happen every month the way that it is supposed to. Doctors aren't sure why this happens in some cases.      What are the symptoms of PCOS?  Symptoms can include:     ? Having fewer than 8 periods a year     ? Growing thick, dark hair on the upper lip, chin, sideburn area, chest, or belly     ? Acne (oily skin and pimples on their face)     ? Hair loss from the head     ? Trouble getting pregnant without medical help     ? Weight gain and obesity    Should I see a doctor or nurse, even if my symptoms are mild?  Yes. PCOS increases your risk of other health problems, including:     ? Diabetes (high blood sugar)     ? High cholesterol levels     ? Sleep apnea, which is a sleep disorder that causes people to briefly stop breathing while they sleep     ? Depression or anxiety     ? Eating disorders, such as binge eating or bulimia     ? Less interest in sex    How is PCOS treated?  The most common treatment is to take birth control pills. But there are other treatments than can help with symptoms, too.     ? Birth control pills - This is the " main treatment for PCOS. The pills don't cure the condition. But they can improve many   of its symptoms, like irregular periods, acne, and facial hair. Birth control pills also lower your risk of cancer of the uterus.     ? Anti-androgens - These medicines block hormones that cause some PCOS symptoms like acne and facial hair growth. Spironolactone (brand name: Aldactone) is the most commonly used one.     ? Progestin - This hormone can make your periods regular, but only if you take it regularly. It also lowers the risk of cancer of the uterus. Most doctors use medroxyprogesterone (brand name: Provera) or natural progesterone (brand name: Prometrium).     ? Metformin (brand name: Glucophage) - This medicine can help make your periods more regular. But it works only in about half of the people who try it. In people with diabetes, this medicine helps to lower blood sugar levels.     ? Medicated skin lotion or antibiotics to treat acne     ? Laser therapy or electrolysis to remove extra hair    Is there anything I can do on my own?  Yes. If you have excess weight or obesity, losing weight can improve many of your symptoms. Losing just 5 percent of your body weight can help a lot. As an example, for a person who weighs 200 pounds, this would mean losing 10 pounds.    What if I want to get pregnant?  Most people with PCOS are able to get pregnant, but it is usually easier for those who are not overweight. If you are overweight, losing weight can help make your periods more regular and improve your chances of getting pregnant. If you lose weight but your periods are still irregular, your doctor can give you medicines to help you ovulate and improve your chances of getting pregnant.

## 2024-01-11 DIAGNOSIS — F90.2 ADHD (ATTENTION DEFICIT HYPERACTIVITY DISORDER), COMBINED TYPE: ICD-10-CM

## 2024-01-11 DIAGNOSIS — F90.2 ATTENTION DEFICIT HYPERACTIVITY DISORDER (ADHD), COMBINED TYPE: ICD-10-CM

## 2024-01-11 RX ORDER — DEXTROAMPHETAMINE SACCHARATE, AMPHETAMINE ASPARTATE MONOHYDRATE, DEXTROAMPHETAMINE SULFATE AND AMPHETAMINE SULFATE 3.75; 3.75; 3.75; 3.75 MG/1; MG/1; MG/1; MG/1
15 CAPSULE, EXTENDED RELEASE ORAL DAILY
Qty: 30 CAPSULE | Refills: 0 | Status: SHIPPED | OUTPATIENT
Start: 2024-01-11 | End: 2024-03-02

## 2024-01-11 RX ORDER — DEXTROAMPHETAMINE SACCHARATE, AMPHETAMINE ASPARTATE, DEXTROAMPHETAMINE SULFATE AND AMPHETAMINE SULFATE 3.75; 3.75; 3.75; 3.75 MG/1; MG/1; MG/1; MG/1
TABLET ORAL
Qty: 30 TABLET | Refills: 0 | Status: SHIPPED | OUTPATIENT
Start: 2024-01-11 | End: 2024-07-11

## 2024-01-11 NOTE — PROGRESS NOTES
Refilling both Adderalls. Will have patient sign CSA at a future in person appt.    Reviewed OARRS on 01/11/2024 by AARON Leiva -OARRS has been reviewed and is consistent with prescribed medications, Considered the risks of abuse, dependence, addiction and diversion, Medication is felt to be clinically appropriate based on documented diagnosis

## 2024-01-24 ENCOUNTER — APPOINTMENT (OUTPATIENT)
Dept: BEHAVIORAL HEALTH | Facility: CLINIC | Age: 34
End: 2024-01-24
Payer: COMMERCIAL

## 2024-01-31 PROCEDURE — RXMED WILLOW AMBULATORY MEDICATION CHARGE

## 2024-02-01 ENCOUNTER — PHARMACY VISIT (OUTPATIENT)
Dept: PHARMACY | Facility: CLINIC | Age: 34
End: 2024-02-01
Payer: COMMERCIAL

## 2024-02-22 ENCOUNTER — APPOINTMENT (OUTPATIENT)
Dept: OBSTETRICS AND GYNECOLOGY | Facility: CLINIC | Age: 34
End: 2024-02-22
Payer: COMMERCIAL

## 2024-03-25 NOTE — PROGRESS NOTES
"Assessment/Plan   {Assess/PlanSmartLinks:59237}    Sera BHARDWAJ Hernan, APRN-CNM, APRN-CNP     Subjective   Sera Marie is a 33 y.o. female who is here for a routine exam.     Concerns today:  ***    No LMP recorded.   Periods are {gyn period regularity:715}, lasting {numbers; 0-10:12166} days.   Dysmenorrhea:{gyn dysmenorrhea:716}.   Cyclic symptoms include {sys gyn cyclic sx:47946}.     Sexual Activity: {Sexual activity (female):91109}  Pain with intercourse? {Yes/No:49243}  Loss of desire? {Yes/No:49284}  Able to have an orgasm? {Yes/No:48929}    History of prior STI: {STI:99084}    Current contraception: {contraceptive method:5051}    Last pap: 2018 NILM  History of abnormal Pap smear: yes - ASCUS 2016  Family history of uterine or ovarian cancer: {yes***/no:61549::\"no\"}    Last mammogram: ***  History of abnormal mammogram: {yes***/no:78675::\"no\"}  Family history of breast cancer: {yes***/no:16082::\"no\"}  Menstrual History:  OB History          2    Para   1    Term   1       0    AB   1    Living   1         SAB   1    IAB        Ectopic        Multiple        Live Births                  No LMP recorded.       Objective   There were no vitals taken for this visit.  OBGyn Exam   "

## 2024-03-28 ENCOUNTER — APPOINTMENT (OUTPATIENT)
Dept: OBSTETRICS AND GYNECOLOGY | Facility: CLINIC | Age: 34
End: 2024-03-28
Payer: COMMERCIAL

## 2025-01-03 ENCOUNTER — OFFICE VISIT (OUTPATIENT)
Dept: URGENT CARE | Age: 35
End: 2025-01-03
Payer: COMMERCIAL

## 2025-01-03 ENCOUNTER — PHARMACY VISIT (OUTPATIENT)
Dept: PHARMACY | Facility: CLINIC | Age: 35
End: 2025-01-03
Payer: COMMERCIAL

## 2025-01-03 VITALS
DIASTOLIC BLOOD PRESSURE: 86 MMHG | SYSTOLIC BLOOD PRESSURE: 138 MMHG | TEMPERATURE: 98.6 F | RESPIRATION RATE: 17 BRPM | OXYGEN SATURATION: 96 % | HEART RATE: 78 BPM

## 2025-01-03 DIAGNOSIS — S00.431A CONTUSION OF RIGHT EAR, INITIAL ENCOUNTER: ICD-10-CM

## 2025-01-03 DIAGNOSIS — H72.91 PERFORATION OF TYMPANIC MEMBRANE, RIGHT: Primary | ICD-10-CM

## 2025-01-03 PROCEDURE — RXMED WILLOW AMBULATORY MEDICATION CHARGE

## 2025-01-03 RX ORDER — CIPROFLOXACIN AND DEXAMETHASONE 3; 1 MG/ML; MG/ML
4 SUSPENSION/ DROPS AURICULAR (OTIC) 2 TIMES DAILY
Qty: 7.5 ML | Refills: 0 | Status: SHIPPED | OUTPATIENT
Start: 2025-01-03 | End: 2025-01-22

## 2025-01-03 RX ORDER — AMOXICILLIN 875 MG/1
875 TABLET, FILM COATED ORAL 2 TIMES DAILY
Qty: 14 TABLET | Refills: 0 | Status: SHIPPED | OUTPATIENT
Start: 2025-01-03 | End: 2025-01-10

## 2025-01-03 ASSESSMENT — PATIENT HEALTH QUESTIONNAIRE - PHQ9
SUM OF ALL RESPONSES TO PHQ9 QUESTIONS 1 AND 2: 0
1. LITTLE INTEREST OR PLEASURE IN DOING THINGS: NOT AT ALL
2. FEELING DOWN, DEPRESSED OR HOPELESS: NOT AT ALL

## 2025-01-03 ASSESSMENT — ENCOUNTER SYMPTOMS
TREMORS: 0
CHILLS: 0
DIZZINESS: 0
FEVER: 0
WEAKNESS: 0
SINUS PRESSURE: 0
HEADACHES: 0
CARDIOVASCULAR NEGATIVE: 1
RESPIRATORY NEGATIVE: 1

## 2025-01-03 ASSESSMENT — PAIN SCALES - GENERAL: PAINLEVEL_OUTOF10: 3

## 2025-01-03 NOTE — PROGRESS NOTES
Subjective   Patient ID: Sera Marie is a 34 y.o. female. They present today with a chief complaint of Earache (Leaking fluid and muffle x3 day ear injury on Tuesday right ear).    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  Yesterday 5 AM noticed sudden increased pain followed by blood-tinged discharge, muffled hearing and continued earache so she is here for evaluation.  Denies headache, no dizziness.  No vision changes.  No nausea or vomiting.  No recent cold symptoms no similar symptoms in the past. Muffled hearing, feels underwater.       Earache   Associated symptoms include ear discharge and hearing loss. Pertinent negatives include no headaches.       Past Medical History  Allergies as of 01/03/2025    (No Known Allergies)       (Not in a hospital admission)       Past Medical History:   Diagnosis Date    Abnormal uterine and vaginal bleeding, unspecified 02/06/2017    Abnormal uterine bleeding (AUB)    Acute candidiasis of vulva and vagina 02/10/2017    Yeast infection of the vagina    Acute vaginitis 09/15/2020    Bacterial vaginosis    Acute vaginitis 02/27/2019    Acute vaginitis    Acute vaginitis 11/10/2018    Acute vaginitis    Acute vaginitis     Bacterial vaginosis    Acute vaginitis 06/01/2019    Bacterial vaginosis    Acute vaginitis     Acute vaginitis    Acute vaginitis 06/19/2019    BV (bacterial vaginosis)    Chlamydial infection, unspecified 09/12/2016    Chlamydia    Chlamydial infection, unspecified 04/03/2017    Chlamydia infection    Encounter for gynecological examination (general) (routine) without abnormal findings 07/29/2020    Well woman exam with routine gynecological exam    Encounter for other screening for malignant neoplasm of breast 12/19/2018    Screening for breast cancer    Encounter for screening for malignant neoplasm of cervix  12/19/2018    Screening for cervical cancer    Hirsutism 08/01/2020    Hirsutism    Inadequate sleep hygiene 09/02/2020    History of difficulty sleeping    Other conditions influencing health status 07/19/2019    History of cough    Other conditions influencing health status     History of pregnancy    Other specified health status 11/17/2014    Birth control    Personal history of other diseases of the female genital tract     History of vaginal discharge    Personal history of other diseases of the female genital tract     Personal history of dysmenorrhea    Personal history of other diseases of the female genital tract     History of vaginal discharge    Personal history of other diseases of the female genital tract 02/06/2017    History of abnormal uterine bleeding    Personal history of other diseases of the female genital tract     History of vaginal pruritus    Personal history of other infectious and parasitic diseases     History of gonorrhea    Personal history of other specified conditions 07/19/2019    History of fever    Personal history of other specified conditions 07/19/2019    History of wheezing    Type A blood, Rh positive     Blood type A+    Unspecified abnormal findings in urine     Abnormal urine findings    Urinary tract infection, site not specified 12/21/2013    Urinary tract infection       Past Surgical History:   Procedure Laterality Date    OTHER SURGICAL HISTORY  06/23/2020    [place] tooth extraction    OTHER SURGICAL HISTORY  06/23/2020    Oral surgery        reports that she has never smoked. She has never used smokeless tobacco. She reports that she does not drink alcohol and does not use drugs.    Review of Systems  Review of Systems   Constitutional:  Negative for chills and fever.   HENT:  Positive for ear discharge, ear pain and hearing loss. Negative for congestion, nosebleeds, sinus pressure and tinnitus.    Respiratory: Negative.     Cardiovascular: Negative.     Neurological:  Negative for dizziness, tremors, weakness and headaches.                                  Objective    Vitals:    01/03/25 0840   BP: 138/86   BP Location: Left arm   Patient Position: Sitting   BP Cuff Size: Adult   Pulse: 78   Resp: 17   Temp: 37 °C (98.6 °F)   TempSrc: Oral   SpO2: 96%     Patient's last menstrual period was 12/05/2024.    Physical Exam  Vitals and nursing note reviewed.   Constitutional:       Appearance: She is not ill-appearing or diaphoretic.   HENT:      Head:      Comments: Right TM with perforation, erythematous, normal external canal, nontender mastoid, no flores sign.      Left Ear: There is no impacted cerumen.      Nose: No congestion.   Cardiovascular:      Rate and Rhythm: Normal rate and regular rhythm.   Pulmonary:      Effort: Pulmonary effort is normal.      Breath sounds: Normal breath sounds.   Musculoskeletal:      Cervical back: No rigidity or tenderness.   Lymphadenopathy:      Cervical: No cervical adenopathy.   Neurological:      General: No focal deficit present.      Mental Status: She is alert and oriented to person, place, and time.      Motor: No weakness.      Coordination: Coordination normal.      Gait: Gait normal.   Psychiatric:         Mood and Affect: Mood normal.         Behavior: Behavior normal.         Procedures    Point of Care Test & Imaging Results from this visit  No results found for this visit on 01/03/25.   No results found.    Diagnostic study results (if any) were reviewed by [facility].    Assessment/Plan   Allergies, medications, history, and pertinent labs/EKGs/Imaging reviewed by Joselyn Diez.     Medical Decision Making  34-year-old with right ear perforation, treatment with oral and topical antibiotics.  Please follow-up with ENT in 7 to 10 days.  Please use earplugs during showering.  You may use Tylenol or ibuprofen as needed for symptom relief.  If new or worsening symptoms please return for evaluation or go  to the ER .    Orders and Diagnoses  Diagnoses and all orders for this visit:  Perforation of tympanic membrane, right  -     ciprofloxacin-dexamethasone (Ciprodex) otic suspension; Administer 4 drops into the right ear 2 times a day for 7 days.  -     amoxicillin (Amoxil) 875 mg tablet; Take 1 tablet (875 mg) by mouth 2 times a day for 7 days.  -     Referral to ENT; Future  Contusion of right ear, initial encounter      Medical Admin Record      Patient disposition: Home    Electronically signed by [facility]  10:32 AM

## 2025-01-03 NOTE — PATIENT INSTRUCTIONS
34-year-old with right ear perforation, treatment with oral and topical antibiotics.  Please follow-up with ENT in 7 to 10 days.  Please use earplugs during showering.  You may use Tylenol or ibuprofen as needed for symptom relief.  If new or worsening symptoms please return for evaluation or go to the ER .

## 2025-01-08 ENCOUNTER — OFFICE VISIT (OUTPATIENT)
Dept: OTOLARYNGOLOGY | Facility: CLINIC | Age: 35
End: 2025-01-08
Payer: COMMERCIAL

## 2025-01-08 VITALS
HEART RATE: 80 BPM | HEIGHT: 66 IN | SYSTOLIC BLOOD PRESSURE: 121 MMHG | WEIGHT: 190 LBS | DIASTOLIC BLOOD PRESSURE: 82 MMHG | BODY MASS INDEX: 30.53 KG/M2 | TEMPERATURE: 98.6 F

## 2025-01-08 DIAGNOSIS — H92.11 OTORRHEA OF RIGHT EAR: ICD-10-CM

## 2025-01-08 DIAGNOSIS — H91.91 HEARING LOSS OF RIGHT EAR, UNSPECIFIED HEARING LOSS TYPE: Primary | ICD-10-CM

## 2025-01-08 DIAGNOSIS — H93.8X1 SENSATION OF PLUGGED EAR ON RIGHT SIDE: ICD-10-CM

## 2025-01-08 DIAGNOSIS — H72.91 PERFORATION OF TYMPANIC MEMBRANE, RIGHT: ICD-10-CM

## 2025-01-08 PROCEDURE — 99213 OFFICE O/P EST LOW 20 MIN: CPT | Performed by: NURSE PRACTITIONER

## 2025-01-08 PROCEDURE — 1036F TOBACCO NON-USER: CPT | Performed by: NURSE PRACTITIONER

## 2025-01-08 PROCEDURE — 3008F BODY MASS INDEX DOCD: CPT | Performed by: NURSE PRACTITIONER

## 2025-01-08 PROCEDURE — 99203 OFFICE O/P NEW LOW 30 MIN: CPT | Performed by: NURSE PRACTITIONER

## 2025-01-08 SDOH — ECONOMIC STABILITY: FOOD INSECURITY: WITHIN THE PAST 12 MONTHS, YOU WORRIED THAT YOUR FOOD WOULD RUN OUT BEFORE YOU GOT MONEY TO BUY MORE.: NEVER TRUE

## 2025-01-08 SDOH — ECONOMIC STABILITY: FOOD INSECURITY: WITHIN THE PAST 12 MONTHS, THE FOOD YOU BOUGHT JUST DIDN'T LAST AND YOU DIDN'T HAVE MONEY TO GET MORE.: NEVER TRUE

## 2025-01-08 ASSESSMENT — COLUMBIA-SUICIDE SEVERITY RATING SCALE - C-SSRS
6. HAVE YOU EVER DONE ANYTHING, STARTED TO DO ANYTHING, OR PREPARED TO DO ANYTHING TO END YOUR LIFE?: NO
2. HAVE YOU ACTUALLY HAD ANY THOUGHTS OF KILLING YOURSELF?: NO
1. IN THE PAST MONTH, HAVE YOU WISHED YOU WERE DEAD OR WISHED YOU COULD GO TO SLEEP AND NOT WAKE UP?: NO

## 2025-01-08 ASSESSMENT — PATIENT HEALTH QUESTIONNAIRE - PHQ9
1. LITTLE INTEREST OR PLEASURE IN DOING THINGS: NOT AT ALL
2. FEELING DOWN, DEPRESSED OR HOPELESS: NOT AT ALL
SUM OF ALL RESPONSES TO PHQ9 QUESTIONS 1 AND 2: 0

## 2025-01-08 ASSESSMENT — LIFESTYLE VARIABLES
AUDIT-C TOTAL SCORE: 1
SKIP TO QUESTIONS 9-10: 1
HOW OFTEN DO YOU HAVE A DRINK CONTAINING ALCOHOL: MONTHLY OR LESS
HOW MANY STANDARD DRINKS CONTAINING ALCOHOL DO YOU HAVE ON A TYPICAL DAY: 1 OR 2
HOW OFTEN DO YOU HAVE SIX OR MORE DRINKS ON ONE OCCASION: NEVER

## 2025-01-08 ASSESSMENT — PAIN SCALES - GENERAL: PAINLEVEL_OUTOF10: 0-NO PAIN

## 2025-01-08 ASSESSMENT — ENCOUNTER SYMPTOMS
LOSS OF SENSATION IN FEET: 0
DEPRESSION: 0
OCCASIONAL FEELINGS OF UNSTEADINESS: 0
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
## 2025-01-13 ENCOUNTER — APPOINTMENT (OUTPATIENT)
Dept: AUDIOLOGY | Facility: CLINIC | Age: 35
End: 2025-01-13
Payer: COMMERCIAL

## 2025-02-26 ENCOUNTER — TELEPHONE (OUTPATIENT)
Dept: OBSTETRICS AND GYNECOLOGY | Facility: CLINIC | Age: 35
End: 2025-02-26
Payer: COMMERCIAL

## 2025-02-26 NOTE — TELEPHONE ENCOUNTER
Pt called for a BC RF. She states that her RF's have  and She would like to restart OCP's. She was last seen for a VV 23. Pt was advised that an appointment is needed. She was transferred to the  to schedule.

## 2025-03-04 ENCOUNTER — APPOINTMENT (OUTPATIENT)
Dept: OBSTETRICS AND GYNECOLOGY | Facility: CLINIC | Age: 35
End: 2025-03-04
Payer: COMMERCIAL

## 2025-03-10 ENCOUNTER — APPOINTMENT (OUTPATIENT)
Dept: AUDIOLOGY | Facility: CLINIC | Age: 35
End: 2025-03-10
Payer: COMMERCIAL

## 2025-03-13 ENCOUNTER — APPOINTMENT (OUTPATIENT)
Dept: OBSTETRICS AND GYNECOLOGY | Facility: CLINIC | Age: 35
End: 2025-03-13
Payer: COMMERCIAL

## 2025-03-26 ENCOUNTER — APPOINTMENT (OUTPATIENT)
Dept: AUDIOLOGY | Facility: CLINIC | Age: 35
End: 2025-03-26
Payer: COMMERCIAL

## 2025-03-26 ENCOUNTER — APPOINTMENT (OUTPATIENT)
Dept: OTOLARYNGOLOGY | Facility: CLINIC | Age: 35
End: 2025-03-26
Payer: COMMERCIAL
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
## 2025-05-16 ENCOUNTER — APPOINTMENT (OUTPATIENT)
Dept: BEHAVIORAL HEALTH | Facility: CLINIC | Age: 35
End: 2025-05-16
Payer: COMMERCIAL

## 2025-06-13 ENCOUNTER — APPOINTMENT (OUTPATIENT)
Dept: BEHAVIORAL HEALTH | Facility: CLINIC | Age: 35
End: 2025-06-13
Payer: COMMERCIAL

## 2025-07-11 ENCOUNTER — OFFICE VISIT (OUTPATIENT)
Dept: OBSTETRICS AND GYNECOLOGY | Facility: CLINIC | Age: 35
End: 2025-07-11
Payer: COMMERCIAL

## 2025-07-11 VITALS
SYSTOLIC BLOOD PRESSURE: 138 MMHG | DIASTOLIC BLOOD PRESSURE: 89 MMHG | HEIGHT: 66 IN | BODY MASS INDEX: 30.12 KG/M2 | WEIGHT: 187.4 LBS

## 2025-07-11 DIAGNOSIS — Z12.4 CERVICAL CANCER SCREENING: ICD-10-CM

## 2025-07-11 DIAGNOSIS — Z30.41 ORAL CONTRACEPTIVE USE: Primary | ICD-10-CM

## 2025-07-11 DIAGNOSIS — R11.0 NAUSEA: ICD-10-CM

## 2025-07-11 DIAGNOSIS — E28.2 PCOS (POLYCYSTIC OVARIAN SYNDROME): ICD-10-CM

## 2025-07-11 DIAGNOSIS — N93.9 ABNORMAL UTERINE BLEEDING (AUB): ICD-10-CM

## 2025-07-11 LAB — PREGNANCY TEST URINE, POC: NEGATIVE

## 2025-07-11 PROCEDURE — 1036F TOBACCO NON-USER: CPT | Performed by: STUDENT IN AN ORGANIZED HEALTH CARE EDUCATION/TRAINING PROGRAM

## 2025-07-11 PROCEDURE — 81025 URINE PREGNANCY TEST: CPT | Performed by: STUDENT IN AN ORGANIZED HEALTH CARE EDUCATION/TRAINING PROGRAM

## 2025-07-11 PROCEDURE — 99214 OFFICE O/P EST MOD 30 MIN: CPT | Performed by: STUDENT IN AN ORGANIZED HEALTH CARE EDUCATION/TRAINING PROGRAM

## 2025-07-11 PROCEDURE — 3008F BODY MASS INDEX DOCD: CPT | Performed by: STUDENT IN AN ORGANIZED HEALTH CARE EDUCATION/TRAINING PROGRAM

## 2025-07-11 RX ORDER — ONDANSETRON 4 MG/1
4 TABLET, ORALLY DISINTEGRATING ORAL EVERY 6 HOURS PRN
Qty: 30 TABLET | Refills: 1 | Status: SHIPPED | OUTPATIENT
Start: 2025-07-11 | End: 2025-08-10

## 2025-07-11 RX ORDER — NORGESTIMATE AND ETHINYL ESTRADIOL 0.25-0.035
1 KIT ORAL DAILY
Qty: 28 TABLET | Refills: 0 | Status: SHIPPED | OUTPATIENT
Start: 2025-07-11 | End: 2026-07-11

## 2025-07-11 NOTE — PROGRESS NOTES
Subjective   Patient ID [id number]   Sera Marie is a 34 y.o.  who presents today for AUB. Pt with known PCOS diagnosed by oligomenorrhea, biochemical hyperandrogenism, and PCOM. Menses largely monthly though does go 2-3 months without a period on occasion. Her had a normal period in April then had intermittent bleeding in May and was not sure if this was a true period. She then started bleeding heavily two weeks ago and has not stopped. She denies lightheadedness. She is currently sexually active with one male partner and not using contraception. She is not trying for pregnancy but would be open to pregnancy should it occur. Has used cOCPs in the past which have regulated her periods but felt like she had mood changes and nausea associated with them. She has expelled an IUD in the past. She has also been on DMPA which she did not like.     OBHx:   PMHx: ADHD, PCOS  SurgHx: denies  Meds: denies  Allergies: NKDA  Social: denies t/e/I  FHx: denies gyn cancers    Objective   Physical Exam  Vitals:    25 1127   BP: 138/89      Gen: awake, alert  Head: NCAT  HEENT: moist mucus membranes  Pulm: breathing comfortably on room air  CV: warm and well-perfused  : moderate amount of blood in vault, cervix without lesion, pap collected, bimanual with mobile 10 week sized uterus and no adnexal mass  Neuro: alert and oriented  Psych: appropriate affect     Assessment/Plan     Sera Marie is a 34 y.o.  with known PCOS who presents today for AUB.     AUB  -Reviewed structural and systemic causes  -Overall suspicion is for AUB-O given her history of known PCOS however will send for TVUS to exclude structural etiologies as well  -Pap and GC/CT collected  -UPT negative in the office  -CBC and TSH drawn. PCOS labs reviewed from  and missing FSH, estradiol, and prolactin so these were sent today as well. Fasting lipid panel and A1c collected  -Discussed recommendation for endometrial sampling given  AUB with known ovulatory dysfunction. Pt amenable. Will plan to complete at next visit once labs and imaging are resulted  -Discussed options for management. No contraindications to estrogen. Does not require contraception as she would be open to pregnancy should it occur. Discussed nexplanon, IUD, OCPs, POPs, and provera withdrawal bleed as options. Currently desires repeat trial of cOCPs. Will plan for OCP taper with Sprintec 2 pills daily until bleeding stops then 1 pill daily. Plan to transition to lo loestrin after first pack given side effects she had from estrogen previously. Zofran Rx'd for nausea     Follow up in 2-4 weeks to review results and complete EMB.    Micki Thurston MD

## 2025-07-12 LAB
C TRACH RRNA SPEC QL NAA+PROBE: NOT DETECTED
CHOLEST SERPL-MCNC: 182 MG/DL
CHOLEST/HDLC SERPL: 3.6 (CALC)
ERYTHROCYTE [DISTWIDTH] IN BLOOD BY AUTOMATED COUNT: 12.6 % (ref 11–15)
EST. AVERAGE GLUCOSE BLD GHB EST-MCNC: 111 MG/DL
EST. AVERAGE GLUCOSE BLD GHB EST-SCNC: 6.2 MMOL/L
ESTRADIOL SERPL-MCNC: 99 PG/ML
FSH SERPL-ACNC: 5.3 MIU/ML
HBA1C MFR BLD: 5.5 %
HCT VFR BLD AUTO: 42.2 % (ref 35–45)
HDLC SERPL-MCNC: 51 MG/DL
HGB BLD-MCNC: 13.3 G/DL (ref 11.7–15.5)
LDLC SERPL CALC-MCNC: 116 MG/DL (CALC)
MCH RBC QN AUTO: 28.2 PG (ref 27–33)
MCHC RBC AUTO-ENTMCNC: 31.5 G/DL (ref 32–36)
MCV RBC AUTO: 89.4 FL (ref 80–100)
N GONORRHOEA RRNA SPEC QL NAA+PROBE: NOT DETECTED
NONHDLC SERPL-MCNC: 131 MG/DL (CALC)
PLATELET # BLD AUTO: 253 THOUSAND/UL (ref 140–400)
PMV BLD REES-ECKER: 10.9 FL (ref 7.5–12.5)
PROLACTIN SERPL-MCNC: 5.9 NG/ML
QUEST GC CT AMPLIFIED (ALWAYS MESSAGE): NORMAL
RBC # BLD AUTO: 4.72 MILLION/UL (ref 3.8–5.1)
TRIGL SERPL-MCNC: 56 MG/DL
TSH SERPL-ACNC: 1.01 MIU/L
WBC # BLD AUTO: 5.4 THOUSAND/UL (ref 3.8–10.8)

## 2025-07-18 ENCOUNTER — APPOINTMENT (OUTPATIENT)
Dept: BEHAVIORAL HEALTH | Facility: CLINIC | Age: 35
End: 2025-07-18
Payer: COMMERCIAL

## 2025-07-29 ENCOUNTER — TELEPHONE (OUTPATIENT)
Dept: OBSTETRICS AND GYNECOLOGY | Facility: CLINIC | Age: 35
End: 2025-07-29
Payer: COMMERCIAL

## 2025-07-29 NOTE — TELEPHONE ENCOUNTER
----- Message from Micki Thurston sent at 7/29/2025 11:09 AM EDT -----  Regarding: EMB 7/31  This pt has an EMB on 7/31 but has not yet scheduled her pelvic US. Ideally would like US resulted before EMB if possible. Can you call her? Still happy to see her Thursday for a check-in and we can discuss pros/cons of completing EMB before US results or alternatively can reschedule for after her US

## 2025-07-31 ENCOUNTER — APPOINTMENT (OUTPATIENT)
Dept: OBSTETRICS AND GYNECOLOGY | Facility: CLINIC | Age: 35
End: 2025-07-31
Payer: COMMERCIAL

## 2025-07-31 DIAGNOSIS — N93.9 ABNORMAL UTERINE BLEEDING (AUB): Primary | ICD-10-CM

## 2025-07-31 RX ORDER — NORETHINDRONE ACETATE AND ETHINYL ESTRADIOL 1MG-20(21)
1 KIT ORAL DAILY
Qty: 84 TABLET | Refills: 3 | Status: SHIPPED | OUTPATIENT
Start: 2025-07-31 | End: 2026-07-31

## 2025-08-01 LAB
CYTOLOGY CMNT CVX/VAG CYTO-IMP: NORMAL
HPV HR 12 DNA GENITAL QL NAA+PROBE: NEGATIVE
HPV HR GENOTYPES PNL CVX NAA+PROBE: NEGATIVE
HPV16 DNA SPEC QL NAA+PROBE: NEGATIVE
HPV18 DNA SPEC QL NAA+PROBE: NEGATIVE
LAB AP HPV GENOTYPE QUESTION: YES
LAB AP HPV HR: NORMAL
LABORATORY COMMENT REPORT: NORMAL
LABORATORY COMMENT REPORT: NORMAL
LMP START DATE: NORMAL
PATH REPORT.TOTAL CANCER: NORMAL

## 2025-08-15 ENCOUNTER — APPOINTMENT (OUTPATIENT)
Dept: RADIOLOGY | Facility: HOSPITAL | Age: 35
End: 2025-08-15
Payer: COMMERCIAL